# Patient Record
Sex: FEMALE | Race: BLACK OR AFRICAN AMERICAN | NOT HISPANIC OR LATINO | Employment: FULL TIME | URBAN - METROPOLITAN AREA
[De-identification: names, ages, dates, MRNs, and addresses within clinical notes are randomized per-mention and may not be internally consistent; named-entity substitution may affect disease eponyms.]

---

## 2018-02-26 ENCOUNTER — OFFICE VISIT (OUTPATIENT)
Dept: OBSTETRICS AND GYNECOLOGY | Facility: CLINIC | Age: 53
End: 2018-02-26
Payer: COMMERCIAL

## 2018-02-26 VITALS
WEIGHT: 187.38 LBS | SYSTOLIC BLOOD PRESSURE: 142 MMHG | BODY MASS INDEX: 34.48 KG/M2 | DIASTOLIC BLOOD PRESSURE: 92 MMHG | HEIGHT: 62 IN

## 2018-02-26 DIAGNOSIS — N92.1 MENORRHAGIA WITH IRREGULAR CYCLE: Primary | ICD-10-CM

## 2018-02-26 LAB
C TRACH DNA SPEC QL NAA+PROBE: NOT DETECTED
N GONORRHOEA DNA SPEC QL NAA+PROBE: NOT DETECTED

## 2018-02-26 PROCEDURE — 87491 CHLMYD TRACH DNA AMP PROBE: CPT

## 2018-02-26 PROCEDURE — 99999 PR PBB SHADOW E&M-EST. PATIENT-LVL III: CPT | Mod: PBBFAC,,, | Performed by: NURSE PRACTITIONER

## 2018-02-26 PROCEDURE — 81025 URINE PREGNANCY TEST: CPT | Mod: S$GLB,,, | Performed by: NURSE PRACTITIONER

## 2018-02-26 PROCEDURE — 3008F BODY MASS INDEX DOCD: CPT | Mod: S$GLB,,, | Performed by: NURSE PRACTITIONER

## 2018-02-26 PROCEDURE — 99214 OFFICE O/P EST MOD 30 MIN: CPT | Mod: S$GLB,,, | Performed by: NURSE PRACTITIONER

## 2018-02-26 RX ORDER — AMLODIPINE BESYLATE 5 MG/1
5 TABLET ORAL DAILY
Refills: 11 | COMMUNITY
Start: 2018-02-07 | End: 2022-07-28 | Stop reason: ALTCHOICE

## 2018-02-26 RX ORDER — MEDROXYPROGESTERONE ACETATE 10 MG/1
10 TABLET ORAL DAILY
Qty: 10 TABLET | Refills: 0 | Status: SHIPPED | OUTPATIENT
Start: 2018-02-26 | End: 2018-03-08

## 2018-02-26 NOTE — PATIENT INSTRUCTIONS
Heavy Menstrual Bleeding    Heavy menstrual bleeding means that your periods are heavier or longer than usual. You may soak through a pad or tampon every 1 to 3 hours on the heaviest days of your period. You may also pass large, dark clots. And your periods may last longer than 7 days.  If you have heavy periods often, this can cause a problem called anemia. With anemia, your red blood cell count is too low. Red blood cells are needed because they help carry oxygen throughout your body. Severe anemia may cause you to look pale and feel weak or tired. You might also become short of breath easily.  There are many possible causes of heavy menstrual bleeding. Hormonal imbalance is the most common cause. Having benign growths in your uterus, such as fibroids or polyps, is another cause. Taking certain medicines or having certain health problems or bleeding disorders are also causes.  To treat heavy menstrual bleeding, medicines are often tried first. If these dont help, further testing and treatments will likely be needed.  Home care  Medicines  If youre prescribed medicines, be sure to take them as directed.  · To help control heavy bleeding, any of the following may be used:  ¨ Hormone therapy (this includes all methods of hormonal birth control such as pills, shots, cream, ring, patch, or hormone-releasing IUD)  ¨ Nonsteroidal anti-inflammatory drugs (NSAIDs), such as ibuprofen  ¨ Antifibrinolytic medicines, such as transexamic acid  · To help treat anemia, iron supplements may be prescribed.            General care  · Get plenty of rest if you tire easily. Avoid heavy exertion.  · To help relieve pain or cramping, try using a heating pad on the lower belly or back. A warm bath may also help.  Follow-up care  Follow up with your healthcare provider as directed.  When to seek medical advice  Call your healthcare provider right away if any of these occur:  · Heavier bleeding (soaking 1 pad or tampon every hour for 3  hours)  · Heavy bleeding that lasts longer than 1 week  · Fever of 100.4ºF (38ºC) or higher, or as directed by your provider  · Pain or cramping that gets worse instead of better  · Signs of anemia such as pale skin, extreme fatigue or weakness, or shortness of breath  · Dizziness or fainting  Date Last Reviewed: 6/11/2015  © 0004-5707 [employer]. [address] [place] [address], [place] [address]. All rights reserved. This information is not intended as a substitute for professional medical care. Always follow your healthcare professional's instructions.        Dysfunctional Uterine Bleeding    Dysfunctional uterine bleeding is a condition in which bleeding is abnormal and occurs at unexpected times of the month. This happens because of changes in the hormones that help control a womans menstrual cycle each month.  The bleeding may be heavier or lighter than normal. If you have heavy bleeding often, this can lead to a problem called anemia. With anemia, your red blood cell count is too low. Red blood cells are needed because they help carry oxygen throughout your body. Severe anemia may cause you to look pale and feel very weak or tired. You might also become short of breath easily.  To treat dysfunctional uterine bleeding, medicines are often tried first. If these dont help, further testing and treatments may be needed. Discuss all of your options with your provider.  Home care  Medicines  If youre prescribed medicines, be sure to take them as directed. Some of the more common medicines you may be prescribed include:  · Hormone therapy (Options include most methods of hormonal birth control such as pills, shots, or a hormone-releasing IUD)  · Nonsteroidal anti-inflammatory drugs (NSAIDs), such as ibuprofen  · Iron supplements, if you have anemia     General care  · Get plenty of rest if you tire easily. Avoid heavy exertion.  · To help relieve pain or cramping that may occur with bleeding, try using a  heating pad on the lower belly or back. A warm bath may also help.  Follow-up care  Follow up with your healthcare provider as directed.  When to seek medical advice  Call your healthcare provider right away if:  · Bleeding becomes heavy (soaking 1 pad or tampon every hour for 3 hours)  · Increased abdominal pain  · Irregular bleeding worsens or does not get better even with treatment  · Fever of 100.4ºF (38ºC) or higher, or as directed by your provider  · Signs of anemia, such as pale skin, extreme fatigue or weakness, or shortness of breath  · Dizziness or fainting   Date Last Reviewed: 6/11/2015 © 2000-2017 [employer]. [address] [place] [address], [place] [address]. All rights reserved. This information is not intended as a substitute for professional medical care. Always follow your healthcare professional's instructions.        Understanding Uterine Bleeding  Your uterine bleeding may be heavy. Or you may have bleeding between periods. These problems may be caused by hormonal imbalance. Or they can be caused by uterine growths, an intrauterine device (IUD), bleeding disorder, or pregnancy.  Hormonal imbalance  Your menstrual cycle is controlled by hormones. The hormones include estrogen and progesterone. Sometimes there is too much or too little of 1 or both of these hormones. This can cause heavy periods. Or it can cause bleeding between periods. Causes of hormonal imbalance can include:  · Hormonal changes in teens and in women nearing menopause  · Diabetes, thyroid disease, or other medical problems  · Obesity  · Stress  · Strenuous exercise  · Anorexia (an eating disorder)  · Pregnancy  Uterine growths  There are different kinds of uterine growths. These include:  · Fibroids. These are round knots of muscle tissue in the uterus.  · Polyps. These are soft tissue growths in the uterine lining. They often hang into the uterus.  · Adenomyosis. This is when the uterine lining grows into the muscle  wall.  · Hyperplasia. This is when the uterine lining gets too thick or grows too much.  · Endometrial cancer. This is uncontrolled growth of part of the uterine lining.  Other causes of uterine bleeding  There are other causes of uterine bleeding. These include:  · IUD (intrauterine device). This is a method of birth control. Some IUDs contain hormones.  · Bleeding disorders. This is when the blood can't clot properly.  Treatment  Your health care provider can help diagnose the cause of your bleeding problem. He or she will work then work with you to plan treatment as needed.  Date Last Reviewed: 7/6/2015  © 3212-8879 [employer]. [address] [place] [address], [place] [address]. All rights reserved. This information is not intended as a substitute for professional medical care. Always follow your healthcare professional's instructions.

## 2018-02-26 NOTE — PROGRESS NOTES
"    Regla Ortez is a 52 y.o. female  presents for heavy painful bleeding for this month.  Still having monthly cycles typically no pain and flow is 3-5 days not too heavy.  But this month has lasted 10 days and has been heavy and more painful that usual.  LMP: Patient's last menstrual period was 2018..        Past Medical History:   Diagnosis Date    Hypertension      Past Surgical History:   Procedure Laterality Date     SECTION      x 1    SHOULDER SURGERY       Social History     Social History    Marital status:      Spouse name: N/A    Number of children: N/A    Years of education: N/A     Occupational History    Not on file.     Social History Main Topics    Smoking status: Never Smoker    Smokeless tobacco: Not on file    Alcohol use 0.0 oz/week      Comment: socially    Drug use: No    Sexual activity: Yes     Partners: Male     Birth control/ protection: None     Other Topics Concern    Not on file     Social History Narrative    No narrative on file     Family History   Problem Relation Age of Onset    Diabetes Maternal Grandmother     Miscarriages / Stillbirths Maternal Grandmother     Hypertension Mother     Diabetes Maternal Aunt     Hypertension Maternal Aunt     Stroke Maternal Uncle     Breast cancer Neg Hx     Cancer Neg Hx     Colon cancer Neg Hx     Eclampsia Neg Hx      labor Neg Hx     Ovarian cancer Neg Hx      OB History      Para Term  AB Living    3 2 2   1 2    SAB TAB Ectopic Multiple Live Births    1                  BP (!) 142/92   Ht 5' 2" (1.575 m)   Wt 85 kg (187 lb 6.3 oz)   LMP 2018   BMI 34.27 kg/m²       ROS:  Per hpi    PHYSICAL EXAM:  APPEARANCE: Well nourished, well developed, in no acute distress.  AFFECT: WNL, alert and oriented x 3    PELVIC: Normal external genitalia without lesions.  Normal hair distribution.  Adequate perineal body, normal urethral meatus.  Vagina moist and well " rugated without lesions or discharge - light bloody.  Cervix pink - some blood at os - tender with taking culture , without lesions, discharge or tenderness.  No significant cystocele or rectocele.  Bimanual exam shows uterus to be 8-10 week size, regular, mobile and tender.  Adnexa without masses or tenderness.    EXTREMITIES: No edema.  Physical Exam    1. Menorrhagia with irregular cycle  POCT Urine Pregnancy    US Pelvis Comp with Transvag NON-OB (xpd    medroxyPROGESTERone (PROVERA) 10 MG tablet    C. trachomatis/N. gonorrhoeae by AMP DNA Cervix    AND PLAN:    upt negative in office today  Check us and f/u with gyn MD

## 2018-02-27 ENCOUNTER — PATIENT MESSAGE (OUTPATIENT)
Dept: OBSTETRICS AND GYNECOLOGY | Facility: CLINIC | Age: 53
End: 2018-02-27

## 2018-03-01 ENCOUNTER — TELEPHONE (OUTPATIENT)
Dept: RADIOLOGY | Facility: HOSPITAL | Age: 53
End: 2018-03-01

## 2018-03-02 ENCOUNTER — PATIENT MESSAGE (OUTPATIENT)
Dept: OBSTETRICS AND GYNECOLOGY | Facility: CLINIC | Age: 53
End: 2018-03-02

## 2018-03-02 ENCOUNTER — HOSPITAL ENCOUNTER (OUTPATIENT)
Dept: RADIOLOGY | Facility: HOSPITAL | Age: 53
Discharge: HOME | End: 2018-03-02
Attending: NURSE PRACTITIONER
Payer: COMMERCIAL

## 2018-03-02 DIAGNOSIS — N92.1 MENORRHAGIA WITH IRREGULAR CYCLE: ICD-10-CM

## 2018-03-02 PROCEDURE — 76830 TRANSVAGINAL US NON-OB: CPT | Mod: TC,PO

## 2018-03-02 PROCEDURE — 76856 US EXAM PELVIC COMPLETE: CPT | Mod: 26,,, | Performed by: RADIOLOGY

## 2018-03-02 PROCEDURE — 76830 TRANSVAGINAL US NON-OB: CPT | Mod: 26,,, | Performed by: RADIOLOGY

## 2018-03-05 ENCOUNTER — OFFICE VISIT (OUTPATIENT)
Dept: OBSTETRICS AND GYNECOLOGY | Facility: CLINIC | Age: 53
End: 2018-03-05
Payer: COMMERCIAL

## 2018-03-05 VITALS
WEIGHT: 187.38 LBS | SYSTOLIC BLOOD PRESSURE: 130 MMHG | DIASTOLIC BLOOD PRESSURE: 80 MMHG | BODY MASS INDEX: 34.48 KG/M2 | HEIGHT: 62 IN

## 2018-03-05 DIAGNOSIS — D25.9 UTERINE LEIOMYOMA, UNSPECIFIED LOCATION: Primary | ICD-10-CM

## 2018-03-05 PROCEDURE — 99999 PR PBB SHADOW E&M-EST. PATIENT-LVL III: CPT | Mod: PBBFAC,,, | Performed by: OBSTETRICS & GYNECOLOGY

## 2018-03-05 PROCEDURE — 3079F DIAST BP 80-89 MM HG: CPT | Mod: S$GLB,,, | Performed by: OBSTETRICS & GYNECOLOGY

## 2018-03-05 PROCEDURE — 99213 OFFICE O/P EST LOW 20 MIN: CPT | Mod: S$GLB,,, | Performed by: OBSTETRICS & GYNECOLOGY

## 2018-03-05 PROCEDURE — 3075F SYST BP GE 130 - 139MM HG: CPT | Mod: S$GLB,,, | Performed by: OBSTETRICS & GYNECOLOGY

## 2018-03-05 NOTE — PROGRESS NOTES
Subjective:       Patient ID: Regla Ortez is a 52 y.o. female.    Chief Complaint:  Follow-up (ultrasound)      History of Present Illness  HPI  Uterine Fibroids  Patient presents with uterine fibroids. Periods are regular every 28-30 days, lasting 5 days. Last month, pt experienced a 10 day periods that was significantly heavier than usual an even passed several clots.  Dysmenorrhea:mild, occurring first 1-2 days of flow. Cyclic symptoms include none.  Cycles are moderate in flow.  Pt is sexually active with  (BTL).  Has no desire for future fertility.  Last pap NILM in .  Pt is a .  Labs in care everywhere reviewed (anemia).      GYN & OB History  Patient's last menstrual period was 2018.   Date of Last Pap: 2015    OB History    Para Term  AB Living   3 2 2   1 2   SAB TAB Ectopic Multiple Live Births   1              # Outcome Date GA Lbr Jean Paul/2nd Weight Sex Delivery Anes PTL Lv   3 SAB            2 Term      CS-Unspec      1 Term      Vag-Spont EPI            Review of Systems  Review of Systems   Constitutional: Positive for fatigue. Negative for activity change, appetite change, fever and unexpected weight change.   Respiratory: Negative for shortness of breath.    Cardiovascular: Negative for chest pain.   Gastrointestinal: Negative for abdominal pain, bloating, blood in stool, constipation, diarrhea, nausea and vomiting.   Genitourinary: Positive for menorrhagia and menstrual problem. Negative for dyspareunia, dysuria, flank pain, frequency, genital sores, hematuria, pelvic pain, vaginal bleeding, vaginal discharge, vaginal pain, dysmenorrhea, urinary incontinence and vaginal odor.   Musculoskeletal: Negative for back pain.   Neurological: Negative for syncope and headaches.           Objective:    Physical Exam:   Constitutional: She is oriented to person, place, and time. She appears well-developed and well-nourished. No distress.                            Neurological: She is alert and oriented to person, place, and time.     Psychiatric: She has a normal mood and affect. Her behavior is normal. Thought content normal.          US pelvis: Uterus is prominent in size measuring 14.0 x 5.9 x 7.6 cm.  Several well-circumscribed nodule areas of decreased echotexture identified within the anterior body/fundal and posterior mid body/lower uterine segment regions.  Appearance most consistent with fibroids.  These measure 2.8 and 2.9 cm maximum diameter in the fundal region, 2.6 cm and the LEFT anterior lateral body region and 1.3 cm cyst posteriorly in the lower uterine segment.  The endometrium measures 9.2 mm set.  No fluid within the endometrial or cervical canals.  Ovaries are normal and symmetric in appearance bilaterally with numerous follicles noted.  Flow identified within both ovaries on color/Doppler imaging.  No free fluid within the cul-de-sac or adnexal regions.  RIGHT ovary measures 2.7 x 2.0 x 2.7 cm and the LEFT 2.2 x 2.6 x 2.7 cm.    Assessment:        1. Uterine leiomyoma, unspecified location             Plan:      Uterine leiomyoma, unspecified location  -     Pt was counseled on fibroids, including common signs and symptoms.  Symptoms have only recently become disruptive and have led to anemia.  Pt is done with her fertility and has no desire for future childbearing.  Treatment options were reviewed with pt, including medical vs fibroid embolization vs myomectomy vs hysterectomy.  Pt voiced understanding and desires to think about options and monitor the progress of her symptoms.  Pt was counseled on warning signs.      Follow-up in about 3 months (around 6/5/2018).     Total time spent: 30 min. 50 % or more was spent on counseling about diagnosis, treatment options, and coordination of care.

## 2018-03-05 NOTE — PATIENT INSTRUCTIONS
What Are Fibroids?  Fibroids are growths made up of connective tissue and muscle cells that usually form in the wall of your uterus. Other names for fibroids are myomas and leiomyomas. Fibroids are the most common tumor in women. They are almost always noncancerous (benign) and harmless. Fibroids start as pea-sized lumps, but can grow steadily during your reproductive years. Many fibroids just need to be watched. Others may need treatment if they become too large or cause symptoms.    Potential problems  Fibroids often cause no symptoms. But a fibroid that grows quickly in your uterus can cause 1 or more of the following problems:  · Excessive uterine bleeding, leading to anemia (lack of red blood cells)  · Frequent urge to urinate  · Difficulty having bowel movements  · Achiness, heaviness, or fullness  · Back or abdominal pain  · Pain during intercourse  · Difficulty getting pregnant or being unable to get pregnant  · Problems with pregnancy  · Enlargement of the lower abdomen  Treatment is tailored for you  No 2 fibroids are the same. The type of treatment you will have depends on their number, size, location, and rate of growth. Your treatment decision also depends on the severity of your symptoms and whether or not you plan to have children in the future. There are a growing number of effective ways to treat fibroids. After your medical evaluation, your health care provider will be able to discuss with you the best options to solve your particular problem and meet your needs.  Your future checkups  Treating your fibroids is likely to relieve your symptoms. But your health care provider will want to check your progress. Ask your health care provider about any additional follow-up visits you might need.   Date Last Reviewed: 5/10/2015  © 9210-5746 [employer]. [address] [place] [address], [place] [address]. All rights reserved. This information is not intended as a substitute for professional medical  care. Always follow your healthcare professional's instructions.        Laparoscopic Hysterectomy: Your Experience  Talk to your healthcare provider about how to get ready for your surgery. Your healthcare providers will talk with you about what to expect as surgery draws near. Keep in mind that your experience may differ from that of other women you know.  Before the day of surgery  Your instructions may include the following:  · Stop taking certain medicines (including aspirin) for as many days before surgery as directed.  · If you smoke, stop as long as possible before surgery.  · Do not eat or drink anything after midnight the night before surgery. This includes chewing gum and mints.  · Arrange ahead of time for a ride home from the hospital or surgery center.  · If it is prescribed, take medicine to clean out your bowels the day before surgery. Your healthcare provider can give you more details about this.  On the day of surgery  Youll change into a gown. Youll then be prepped for your procedure:  · The anesthesiologist or nurse anesthetist will discuss anesthesia with you and answer any questions you have.  · Some pubic hair may be shaved.  · An IV (intravenous) line will be put into your arm or hand. This line supplies you with medicines and fluids before, during, and after surgery.  · You may be given medicine that helps you relax. You will then be given general anesthesia to make you sleep and keep you free from pain during surgery.  Risks and complications of laparoscopic hysterectomy  Once you understand these risks, you will be asked to sign a consent form. Risks and possible complications include:  · Side effects from anesthesia  · Infection  · Bleeding, with a possible need for a transfusion  · Blood clots  · Damage to the bladder, bowel, ureters, or nearby nerves or blood vessels  · Formation of scar tissue that may cause pain or bowel obstruction in the future (more common with abdominal  approach)  · Need for a second surgery   Date Last Reviewed: 3/1/2017  © 2933-4067 [employer] [address] [place] [address], [place] [address]. All rights reserved. This information is not intended as a substitute for professional medical care. Always follow your healthcare professional's instructions.

## 2018-04-06 ENCOUNTER — OFFICE VISIT (OUTPATIENT)
Dept: OBSTETRICS AND GYNECOLOGY | Facility: CLINIC | Age: 53
End: 2018-04-06
Payer: COMMERCIAL

## 2018-04-06 ENCOUNTER — LAB VISIT (OUTPATIENT)
Dept: LAB | Facility: HOSPITAL | Age: 53
End: 2018-04-06
Attending: NURSE PRACTITIONER
Payer: COMMERCIAL

## 2018-04-06 VITALS
BODY MASS INDEX: 35.07 KG/M2 | HEIGHT: 62 IN | DIASTOLIC BLOOD PRESSURE: 88 MMHG | SYSTOLIC BLOOD PRESSURE: 137 MMHG | WEIGHT: 190.56 LBS

## 2018-04-06 DIAGNOSIS — Z11.3 SCREENING FOR STD (SEXUALLY TRANSMITTED DISEASE): ICD-10-CM

## 2018-04-06 DIAGNOSIS — A59.01 TRICHOMONAS VAGINITIS: ICD-10-CM

## 2018-04-06 DIAGNOSIS — N89.8 VAGINAL ITCHING: Primary | ICD-10-CM

## 2018-04-06 PROCEDURE — 99999 PR PBB SHADOW E&M-EST. PATIENT-LVL III: CPT | Mod: PBBFAC,,, | Performed by: NURSE PRACTITIONER

## 2018-04-06 PROCEDURE — 86703 HIV-1/HIV-2 1 RESULT ANTBDY: CPT

## 2018-04-06 PROCEDURE — 3079F DIAST BP 80-89 MM HG: CPT | Mod: CPTII,S$GLB,, | Performed by: NURSE PRACTITIONER

## 2018-04-06 PROCEDURE — 87210 SMEAR WET MOUNT SALINE/INK: CPT | Mod: QW,S$GLB,, | Performed by: NURSE PRACTITIONER

## 2018-04-06 PROCEDURE — 36415 COLL VENOUS BLD VENIPUNCTURE: CPT | Mod: PO

## 2018-04-06 PROCEDURE — 99214 OFFICE O/P EST MOD 30 MIN: CPT | Mod: S$GLB,,, | Performed by: NURSE PRACTITIONER

## 2018-04-06 PROCEDURE — 80074 ACUTE HEPATITIS PANEL: CPT

## 2018-04-06 PROCEDURE — 87491 CHLMYD TRACH DNA AMP PROBE: CPT

## 2018-04-06 PROCEDURE — 86592 SYPHILIS TEST NON-TREP QUAL: CPT

## 2018-04-06 PROCEDURE — 3075F SYST BP GE 130 - 139MM HG: CPT | Mod: CPTII,S$GLB,, | Performed by: NURSE PRACTITIONER

## 2018-04-06 RX ORDER — METRONIDAZOLE 500 MG/1
TABLET ORAL
Qty: 8 TABLET | Refills: 0 | Status: SHIPPED | OUTPATIENT
Start: 2018-04-06 | End: 2022-07-28 | Stop reason: ALTCHOICE

## 2018-04-06 NOTE — PATIENT INSTRUCTIONS
Trichomonas Vaginal Infection (Trichomoniasis)    You have a Trichomonas vaginal infection. This problem is often called trich. It is caused by a parasite that is passed during sex. This makes trich a sexually transmitted disease (STD). Both men and women can get trich, but it is more common in women.  Most people who have trich dont have any symptoms at first. If symptoms do occur, they may take weeks or months to develop.  Symptoms in women can include:  · Thin discharge from the vagina that may smell bad and be clear, white, gray, green, or yellow in color  · Itching, burning, redness, or soreness in or around the vagina  · Pain in the lower belly  · Frequent urination or pain and burning during urination  · Pain during sex  Symptoms in men are not very common. Men may have trich and pass it to women during sex without knowing they were ever infected.  Trich is most often treated with antibiotics. Without treatment, trich can increase the risk of more serious health problems such as:  · Pelvic inflammatory disease (PID)  ·  delivery (giving birth to a baby early if youre pregnant)  · HIV and certain other sexually transmitted diseases (STDs)  Home care  · Take the antibiotics youre prescribed exactly as directed. Finish all of the medicine, even if your symptoms go away.  · Avoid drinking alcohol until youre done with your treatment.  · Tell any partners you have sex with that you have trich. They will need be tested for trich and possibly treated as well.  · Avoid having sex until you and any partners you have sex with are confirmed to no longer have trich.  Prevention  The only way to avoid getting trich or any other STD is to avoid having sex. If you choose to have sex, then take steps to lower your health risks:  · Use condoms when having sex.  · Limit the number of partners you have sex with.  · Get tested regularly for STDs. Ask any partner you have sex with to do the same.  · Dont have sex  with anyone who has symptoms that may be due to an STD.  Follow-up care  Follow up with your healthcare provider, or as advised. Testing will likely be done to ensure that the infection has cleared.  When to seek medical advice  Call your healthcare provider right away if:  · You have a fever of 100.4ºF (38ºC) or higher, or as directed by your provider.  · Your symptoms worsen, or they dont go away even after completing your treatment.  · You have new pain in the lower belly or pelvic region.  · You have side effects that bother you or a reaction to the medicine youre taking.  · You or any partners you have sex with have new symptoms, such as a rash, joint pain, or sores.  Date Last Reviewed: 7/30/2015  © 4761-7723 [employer]. [address] [place] [address], [place] [address]. All rights reserved. This information is not intended as a substitute for professional medical care. Always follow your healthcare professional's instructions.

## 2018-04-06 NOTE — PROGRESS NOTES
"Regla Ortez is a 52 y.o. female  presents with complaint of vaginal discharge for 1 week.  She reports itching.  reports odor.  She states the discharge is yellow.      Past Medical History:   Diagnosis Date    Anemia     Hypertension      Past Surgical History:   Procedure Laterality Date     SECTION      x 1    SHOULDER SURGERY       Social History   Substance Use Topics    Smoking status: Never Smoker    Smokeless tobacco: Never Used    Alcohol use 0.0 oz/week      Comment: socially     Family History   Problem Relation Age of Onset    Diabetes Maternal Grandmother     Miscarriages / Stillbirths Maternal Grandmother     Hypertension Mother     Diabetes Maternal Aunt     Hypertension Maternal Aunt     Stroke Maternal Uncle     Breast cancer Neg Hx     Cancer Neg Hx     Colon cancer Neg Hx     Eclampsia Neg Hx      labor Neg Hx     Ovarian cancer Neg Hx      OB History    Para Term  AB Living   3 2 2   1 2   SAB TAB Ectopic Multiple Live Births   1              # Outcome Date GA Lbr Jean Paul/2nd Weight Sex Delivery Anes PTL Lv   3 SAB            2 Term      CS-Unspec      1 Term      Vag-Spont EPI            /88   Ht 5' 2" (1.575 m)   Wt 86.4 kg (190 lb 9.4 oz)   LMP 2018 (Exact Date)   BMI 34.86 kg/m²     ROS:  GENERAL: No fever, chills, fatigability or weight loss.  VULVAR: No pain, no lesions and no itching.  VAGINAL: No relaxation, no itching, no discharge, no abnormal bleeding and no lesions.  ABDOMEN: No abdominal pain. Denies nausea. Denies vomiting. No diarrhea. No constipation  BREAST: Denies pain. No lumps. No discharge.  URINARY: No incontinence, no nocturia, no frequency and no dysuria.  CARDIOVASCULAR: No chest pain. No shortness of breath. No leg cramps.  NEUROLOGICAL: No headaches. No vision changes.    PHYSICAL EXAM:  VULVA: normal appearing vulva with no masses, tenderness or lesions, VAGINA: vaginal discharge - frothy and " yellow, CERVIX: normal appearing cervix without discharge or lesions, DNA probe for chlamydia and GC obtained, UTERUS: uterus is 12-14 week size, shape, consistency and nontender, ADNEXA: normal adnexa in size, nontender and no masses, WET MOUNT done - results: trichomonads    ASSESSMENT and PLAN:  1. Vaginal itching  POCT Wet Prep    C. trachomatis/N. gonorrhoeae by AMP DNA Cervix    metroNIDAZOLE (FLAGYL) 500 MG tablet   2. Screening for STD (sexually transmitted disease)  C. trachomatis/N. gonorrhoeae by AMP DNA Cervix    HIV-1 and HIV-2 antibodies    RPR    Hepatitis panel, acute   3. Trichomonas vaginitis  POCT Wet Prep    metroNIDAZOLE (FLAGYL) 500 MG tablet       Patient was counseled today on vaginitis prevention including :  a. avoiding feminine products such as deoderant soaps, body wash, bubble bath, douches, scented toilet paper, deoderant tampons or pads, feminine wipes, chronic pad use, etc.  b. avoiding other vulvovaginal irritants such as long hot baths, humidity, tight, synthetic clothing, chlorine and sitting around in wet bathing suits  c. wearing cotton underwear, avoiding thong underwear and no underwear to bed  d. taking showers instead of baths and use a hair dryer on cool setting afterwards to dry  e. wearing cotton to exercise and shower immediately after exercise and change clothes  f. using polyurethane condoms without spermicide if sexually active and symptoms are triggered by intercourse

## 2018-04-07 LAB
C TRACH DNA SPEC QL NAA+PROBE: NOT DETECTED
N GONORRHOEA DNA SPEC QL NAA+PROBE: NOT DETECTED
RPR SER QL: NORMAL

## 2018-04-09 ENCOUNTER — PATIENT MESSAGE (OUTPATIENT)
Dept: OBSTETRICS AND GYNECOLOGY | Facility: CLINIC | Age: 53
End: 2018-04-09

## 2018-04-09 LAB
HAV IGM SERPL QL IA: NEGATIVE
HBV CORE IGM SERPL QL IA: NEGATIVE
HBV SURFACE AG SERPL QL IA: NEGATIVE
HCV AB SERPL QL IA: NEGATIVE
HIV 1+2 AB+HIV1 P24 AG SERPL QL IA: NEGATIVE

## 2018-04-11 ENCOUNTER — TELEPHONE (OUTPATIENT)
Dept: OBSTETRICS AND GYNECOLOGY | Facility: CLINIC | Age: 53
End: 2018-04-11

## 2018-04-11 NOTE — TELEPHONE ENCOUNTER
Pt stated that she was told she had Trichomonas through sexual contact, and she could not understand how she was wilmer this, because her  went to the doctor and he was told that he did not have anything. She also inquired about the fibroids, and is aware of her doctor's appt she has with Dr. Hebert on 6/1/18. Please Advise.

## 2018-04-11 NOTE — TELEPHONE ENCOUNTER
----- Message from Jaylyn Moses sent at 4/11/2018  3:29 PM CDT -----  Contact: self [phone]  Would like to consult with nurse regarding issue with fibroids.  Please call back at [phone].   Md Katherine

## 2018-04-12 ENCOUNTER — TELEPHONE (OUTPATIENT)
Dept: OBSTETRICS AND GYNECOLOGY | Facility: CLINIC | Age: 53
End: 2018-04-12

## 2018-04-12 NOTE — TELEPHONE ENCOUNTER
Spoke with pt and pt was concerned about trich affecting her fibroid. Emphasized to pt that trich is an STD as has no relation to her getting a fibroid. Pt verbalized understanding.

## 2018-04-12 NOTE — TELEPHONE ENCOUNTER
----- Message from Ericka Barrett sent at 4/12/2018 11:10 AM CDT -----  Contact: pt  Pt stated she was returning a call and can be reached at [phone] please call after 12:15 Thanks

## 2018-04-12 NOTE — TELEPHONE ENCOUNTER
----- Message from Eli Roberts sent at 4/12/2018 10:46 AM CDT -----  Contact: pt  Questions about her last test results. Cell [phone]

## 2018-04-12 NOTE — TELEPHONE ENCOUNTER
Pt notified that vag cx were negative for gt/ct and also hiv/rps and hepatitis labs were negative with verbal understanding ...noelle

## 2018-04-12 NOTE — TELEPHONE ENCOUNTER
Trichomonas is typically only contracted through sexual contact and he can only be tested through urine and typically the male does test negative b/c difficult to  in their urine - it has to come from person she is sexually active with  Keep her appt as scheduled for her fibroids

## 2018-04-23 ENCOUNTER — TELEPHONE (OUTPATIENT)
Dept: OBSTETRICS AND GYNECOLOGY | Facility: CLINIC | Age: 53
End: 2018-04-23

## 2018-04-23 NOTE — TELEPHONE ENCOUNTER
Attempted to call pt. Left voicemail for pt to give clinic a call back. Need to reschedule 04/23/18 appkayley Bernice is not in clinic.

## 2018-04-23 NOTE — TELEPHONE ENCOUNTER
----- Message from Santa Edgar sent at 4/23/2018  2:38 PM CDT -----  returned call, deyanira work in before 10a....[phone]

## 2018-04-26 ENCOUNTER — OFFICE VISIT (OUTPATIENT)
Dept: OBSTETRICS AND GYNECOLOGY | Facility: CLINIC | Age: 53
End: 2018-04-26
Payer: COMMERCIAL

## 2018-04-26 VITALS
HEIGHT: 62 IN | DIASTOLIC BLOOD PRESSURE: 98 MMHG | WEIGHT: 193.31 LBS | SYSTOLIC BLOOD PRESSURE: 134 MMHG | BODY MASS INDEX: 35.57 KG/M2

## 2018-04-26 DIAGNOSIS — A59.01 TRICHOMONAS VAGINITIS: Primary | ICD-10-CM

## 2018-04-26 PROCEDURE — 3075F SYST BP GE 130 - 139MM HG: CPT | Mod: CPTII,S$GLB,, | Performed by: NURSE PRACTITIONER

## 2018-04-26 PROCEDURE — 87210 SMEAR WET MOUNT SALINE/INK: CPT | Mod: QW,S$GLB,, | Performed by: NURSE PRACTITIONER

## 2018-04-26 PROCEDURE — 3080F DIAST BP >= 90 MM HG: CPT | Mod: CPTII,S$GLB,, | Performed by: NURSE PRACTITIONER

## 2018-04-26 PROCEDURE — 99999 PR PBB SHADOW E&M-EST. PATIENT-LVL III: CPT | Mod: PBBFAC,,, | Performed by: NURSE PRACTITIONER

## 2018-04-26 PROCEDURE — 99214 OFFICE O/P EST MOD 30 MIN: CPT | Mod: S$GLB,,, | Performed by: NURSE PRACTITIONER

## 2018-04-26 NOTE — PROGRESS NOTES
female who presents for [facility] for trichamonas.  STI Exposure: Yes -- trichomonas.    Review of Systems  Negative per HPI     Objective:      /90  Ht 5' (1.524 m)  Wt 248 lb (112.492 kg)  BMI 48.43 kg/m2  LMP 07/03/2013  General:   No distress   Pelvic:  NEFG, uterus 12-14 week with fibroid, discharge normal, cervix normal       Assessment:   Wet prep negative   Encounter Diagnoses   Name Primary?    Trichomonas Yes               Plan:      Discussed safe sexual practice in detail  Avoid irritants, douching, feminine hygiene products .   GC/CT cultures and triple smear done and negative at last visit .  STD profile was ordered and was negative

## 2018-05-29 ENCOUNTER — TELEPHONE (OUTPATIENT)
Dept: OBSTETRICS AND GYNECOLOGY | Facility: CLINIC | Age: 53
End: 2018-05-29

## 2018-05-29 NOTE — TELEPHONE ENCOUNTER
Patient was calling to see if she can reschedule her appointment to next week.  Patient is rescheduled for 06/05 at 8am.

## 2018-05-29 NOTE — TELEPHONE ENCOUNTER
----- Message from Shiloh Clemente sent at 5/29/2018  3:30 PM CDT -----  Contact: Pt  Please give pt a call at [phone] regarding some questions about her appt.

## 2018-06-05 ENCOUNTER — OFFICE VISIT (OUTPATIENT)
Dept: OBSTETRICS AND GYNECOLOGY | Facility: CLINIC | Age: 53
End: 2018-06-05
Payer: COMMERCIAL

## 2018-06-05 VITALS
SYSTOLIC BLOOD PRESSURE: 142 MMHG | WEIGHT: 198.44 LBS | DIASTOLIC BLOOD PRESSURE: 88 MMHG | BODY MASS INDEX: 36.29 KG/M2

## 2018-06-05 DIAGNOSIS — D25.9 UTERINE LEIOMYOMA, UNSPECIFIED LOCATION: Primary | ICD-10-CM

## 2018-06-05 PROCEDURE — 99999 PR PBB SHADOW E&M-EST. PATIENT-LVL III: CPT | Mod: PBBFAC,,, | Performed by: OBSTETRICS & GYNECOLOGY

## 2018-06-05 PROCEDURE — 3079F DIAST BP 80-89 MM HG: CPT | Mod: CPTII,S$GLB,, | Performed by: OBSTETRICS & GYNECOLOGY

## 2018-06-05 PROCEDURE — 3077F SYST BP >= 140 MM HG: CPT | Mod: CPTII,S$GLB,, | Performed by: OBSTETRICS & GYNECOLOGY

## 2018-06-05 PROCEDURE — 3008F BODY MASS INDEX DOCD: CPT | Mod: CPTII,S$GLB,, | Performed by: OBSTETRICS & GYNECOLOGY

## 2018-06-05 PROCEDURE — 99212 OFFICE O/P EST SF 10 MIN: CPT | Mod: S$GLB,,, | Performed by: OBSTETRICS & GYNECOLOGY

## 2018-06-05 NOTE — PATIENT INSTRUCTIONS
Laparoscopic Hysterectomy: Your Experience  Talk to your healthcare provider about how to get ready for your surgery. Your healthcare providers will talk with you about what to expect as surgery draws near. Keep in mind that your experience may differ from that of other women you know.  Before the day of surgery  Your instructions may include the following:  · Stop taking certain medicines (including aspirin) for as many days before surgery as directed.  · If you smoke, stop as long as possible before surgery.  · Do not eat or drink anything after midnight the night before surgery. This includes chewing gum and mints.  · Arrange ahead of time for a ride home from the hospital or surgery center.  · If it is prescribed, take medicine to clean out your bowels the day before surgery. Your healthcare provider can give you more details about this.  On the day of surgery  Youll change into a gown. Youll then be prepped for your procedure:  · The anesthesiologist or nurse anesthetist will discuss anesthesia with you and answer any questions you have.  · Some pubic hair may be shaved.  · An IV (intravenous) line will be put into your arm or hand. This line supplies you with medicines and fluids before, during, and after surgery.  · You may be given medicine that helps you relax. You will then be given general anesthesia to make you sleep and keep you free from pain during surgery.  Risks and complications of laparoscopic hysterectomy  Once you understand these risks, you will be asked to sign a consent form. Risks and possible complications include:  · Side effects from anesthesia  · Infection  · Bleeding, with a possible need for a transfusion  · Blood clots  · Damage to the bladder, bowel, ureters, or nearby nerves or blood vessels  · Formation of scar tissue that may cause pain or bowel obstruction in the future (more common with abdominal approach)  · Need for a second surgery   Date Last Reviewed: 3/1/2017  ©  8354-8235 [employer]. [address] [place] [address]. All rights reserved. This information is not intended as a substitute for professional medical care. Always follow your healthcare professional's instructions.        Laparoscopic Hysterectomy: Your Home Recovery  When you leave the hospital, youll receive instructions on caring for yourself at home. Following these instructions helps ensure a faster recovery. It often takes about 1 week to 4 weeks to recover from laparoscopic hysterectomy. But recovery time varies from woman to woman.    Taking care of yourself  Follow these tips to make your recovery as safe and comfortable as possible:  · To avoid constipation, eat fruits, vegetables, and whole grains. Drink plenty of water. Your healthcare provider may suggest that you use a laxative or a mild stool softener.  · Ask your friends and family to help with chores and errands while you recover.  · Do not lift anything over 10 pounds to avoid straining your incisions.  · Do not get your incisions wet until your healthcare provider says its OK to do so.  · Do not put anything in the vagina until your provider says its safe to do so. This includes using tampons and douches and having sexual intercourse.  · Schedule follow-up visits with your healthcare provider.  When to call your healthcare provider  Call your healthcare provider if you notice any of these:  · Chills or a fever of 100.4°F (38°C) or higher, or as advised  · Bright red vaginal bleeding or a smelly discharge  · Trouble urinating or burning during urination  · Severe abdominal pain or bloating  · A red, swollen, or draining smelly fluid from the incision sites  · Trouble breathing or fainting  · Swollen painful leg   Date Last Reviewed: 3/1/2017  © 0888-0491 [employer]. [address] [place] [address]. All rights reserved. This information is not intended as a substitute for professional medical  care. Always follow your healthcare professional's instructions.        Laparoscopic Hysterectomy: Your Hospital Recovery     Walking soon after surgery helps prevent blood clots and breathing problems.     After your procedure, you may go home later the same day of your surgery. Or, you may stay in the hospital or surgery center for 1 to 2 days. Your healthcare providers will help with your recovery during this time. Before you go home, get answers to any questions you have.  Recovery right after surgery  After the procedure is finished, youll spend a few hours in the recovery area. You may feel drowsy or nauseated from the anesthesia. You may also have shoulder pain due to the gas used to expand your abdomen. Your throat may be sore and you may be hoarse due to the breathing tube used during surgery.  What to expect during your hospital stay  During your stay in the hospital or surgery center, the staff will look after you and help you prepare to go home. During this time:  · You will be asked to walk around to help improve breathing and blood flow.  · You will be given medicine for any pain you have. Tell your nurse if you are uncomfortable.  · Incisions may leak a small amount of pinkish fluid for the first couple of days.  · Spotting from the vagina may occur. If so, ask the nurse for a sanitary pad.  · You will be shown how to clear your lungs to prevent infections.  · The catheter may be left in place for a short time after surgery. Youll be checked to make sure you can urinate without the catheter before you go home.  · Your meals can include solid food as soon as you feel ready.  Date Last Reviewed: 3/1/2017  © 1428-6074 [employer]. [address] [place] [address], [place] [address]. All rights reserved. This information is not intended as a substitute for professional medical care. Always follow your healthcare professional's instructions.

## 2018-06-05 NOTE — PROGRESS NOTES
Subjective:       Patient ID: Regla Ortez is a 52 y.o. female.    Chief Complaint:  Follow-up      History of Present Illness  HPI  Pt is here for follow up.  Pt reports that, overall, symptoms have remained unchanged and have been well tolerated.  However, pt is leaning more towards surgery and would like to discuss options again.    GYN & OB History  Patient's last menstrual period was 2018 (approximate).   Date of Last Pap: 2015    OB History    Para Term  AB Living   3 2 2   1 2   SAB TAB Ectopic Multiple Live Births   1              # Outcome Date GA Lbr Jean Paul/2nd Weight Sex Delivery Anes PTL Lv   3 SAB            2 Term      CS-Unspec      1 Term      Vag-Spont EPI            Review of Systems  Review of Systems   Constitutional: Negative for activity change, appetite change, fatigue, fever and unexpected weight change.   Respiratory: Negative for shortness of breath.    Cardiovascular: Negative for chest pain.   Gastrointestinal: Negative for abdominal pain, bloating, blood in stool, constipation, diarrhea, nausea and vomiting.   Genitourinary: Positive for pelvic pain. Negative for dyspareunia, menorrhagia, menstrual problem, vaginal bleeding, vaginal discharge, vaginal pain, dysmenorrhea, urinary incontinence and vaginal odor.   Musculoskeletal: Positive for back pain.   Neurological: Negative for syncope and headaches.           Objective:    Physical Exam:   Constitutional: She is oriented to person, place, and time. She appears well-developed and well-nourished. No distress.                           Neurological: She is alert and oriented to person, place, and time.     Psychiatric: She has a normal mood and affect. Her behavior is normal. Thought content normal.          Assessment:        1. Uterine leiomyoma, unspecified location             Plan:      Uterine leiomyoma, unspecified location  -     Pt was again counseled on fibroids, including common signs and  symptoms.  Symptoms remain somewhat disruptive.  Pt is done with her fertility and has no desire for future childbearing.  Treatment options were again reviewed with pt, including medical vs fibroid embolization vs myomectomy vs hysterectomy.  Pt voiced understanding and is most interested in hysterectomy.  Surgery details, indications, risks, and benefits were reviewed with pt.  Pt desires to think about options some more and will continue with expectant management for now.  Pt is a candidate for hysterectomy if she desires.      Follow-up in about 3 months (around 9/5/2018).

## 2022-07-26 ENCOUNTER — CLINICAL SUPPORT (OUTPATIENT)
Dept: OTHER | Facility: CLINIC | Age: 57
End: 2022-07-26
Payer: COMMERCIAL

## 2022-07-26 DIAGNOSIS — Z00.8 ENCOUNTER FOR OTHER GENERAL EXAMINATION: ICD-10-CM

## 2022-07-26 PROCEDURE — 80061 LIPID PANEL: CPT | Mod: QW,S$GLB,, | Performed by: INTERNAL MEDICINE

## 2022-07-26 PROCEDURE — 80061 PR  LIPID PANEL: ICD-10-PCS | Mod: QW,S$GLB,, | Performed by: INTERNAL MEDICINE

## 2022-07-26 PROCEDURE — 82947 PR  ASSAY QUANTITATIVE,BLOOD GLUCOSE: ICD-10-PCS | Mod: QW,S$GLB,, | Performed by: INTERNAL MEDICINE

## 2022-07-26 PROCEDURE — 99401 PREV MED CNSL INDIV APPRX 15: CPT | Mod: S$GLB,,, | Performed by: INTERNAL MEDICINE

## 2022-07-26 PROCEDURE — 99401 PR PREVENT COUNSEL,INDIV,15 MIN: ICD-10-PCS | Mod: S$GLB,,, | Performed by: INTERNAL MEDICINE

## 2022-07-26 PROCEDURE — 82947 ASSAY GLUCOSE BLOOD QUANT: CPT | Mod: QW,S$GLB,, | Performed by: INTERNAL MEDICINE

## 2022-07-27 ENCOUNTER — NURSE TRIAGE (OUTPATIENT)
Dept: ADMINISTRATIVE | Facility: CLINIC | Age: 57
End: 2022-07-27
Payer: COMMERCIAL

## 2022-07-27 VITALS
DIASTOLIC BLOOD PRESSURE: 91 MMHG | SYSTOLIC BLOOD PRESSURE: 185 MMHG | HEIGHT: 63 IN | BODY MASS INDEX: 35.08 KG/M2 | WEIGHT: 198 LBS

## 2022-07-27 LAB
GLUCOSE SERPL-MCNC: 96 MG/DL (ref 60–140)
HDLC SERPL-MCNC: 58 MG/DL
POC CHOLESTEROL, LDL (DOCK): 96.66 MG/DL
POC CHOLESTEROL, TOTAL: 172 MG/DL
TRIGL SERPL-MCNC: 95 MG/DL

## 2022-07-27 NOTE — TELEPHONE ENCOUNTER
Patient states she had a hypertensive episode at a [facility] on yesterday, 7/26/22. Patient states her blood pressure reading at the Health [facility] was 186/64. Chart Review documents patient's blood pressure reading on yesterday as 185/91. Patient states she was advised to follow-up with her PCP ASAP.     Patient states history of Hypertension and treatment with antihypertensives. Patient states that she has not been under the care of a Physician to treat her diagnosis of Hypertension for approximately 4-5 years and last dose of antihypertensives with also approximately 4-5 years ago. Patient states she has made an appointment with her PCP that is scheduled for tomorrow, 7/28/22.    Care Advice given to to See PCP Within 24 Hours. Patient states understanding of Care Advice and cites no additional concerns at this time. Triage RN also advised patient to limit sodium intake, avoid caffeinated drinks and excess sugar and to begin walking for exercise once cleared by PCP.    Reason for Disposition   Systolic BP  >= 180 OR Diastolic >= 110    Additional Information   Negative: Difficult to awaken or acting confused (e.g., disoriented, slurred speech)   Negative: SEVERE difficulty breathing (e.g., struggling for each breath, speaks in single words)   Negative: [1] Weakness of the face, arm or leg on one side of the body AND [2] new-onset   Negative: [1] Numbness (i.e., loss of sensation) of the face, arm or leg on one side of the body AND [2] new-onset   Negative: [1] Chest pain lasts > 5 minutes AND [2] history of heart disease  (i.e., heart attack, bypass surgery, angina, angioplasty, CHF)   Negative: [1] Chest pain AND [2] took nitrogylcerin AND [3] pain was not relieved   Negative: Sounds like a life-threatening emergency to the triager   Negative: Symptom is main concern  (e.g., headache, chest pain)   Negative: Low blood pressure is main concern   Negative: [1] Systolic BP  >= 160 OR Diastolic >= 100  AND [2] cardiac or neurologic symptoms (e.g., chest pain, difficulty breathing, unsteady gait, blurred vision)   Negative: [1] Pregnant 20 or more weeks (or postpartum < 6 weeks) AND [2] new hand or face swelling   Negative: [1] Pregnant 20 or more weeks AND [2] Systolic BP  >= 140 OR Diastolic >= 90   Negative: [1] Systolic BP  >= 200 OR Diastolic >= 120  AND [2] having NO cardiac or neurologic symptoms   Negative: [1] Postpartum < 6 weeks AND [2] Systolic BP  >= 140 OR Diastolic >= 90   Negative: [1] Systolic BP  >= 180 OR Diastolic >= 110 AND [2] missed most recent dose of blood pressure medication    Protocols used: BLOOD PRESSURE - HIGH-A-

## 2022-07-28 ENCOUNTER — TELEPHONE (OUTPATIENT)
Dept: INTERNAL MEDICINE | Facility: CLINIC | Age: 57
End: 2022-07-28

## 2022-07-28 ENCOUNTER — HOSPITAL ENCOUNTER (OUTPATIENT)
Dept: CARDIOLOGY | Facility: HOSPITAL | Age: 57
Discharge: HOME | End: 2022-07-28
Attending: PHYSICIAN ASSISTANT
Payer: COMMERCIAL

## 2022-07-28 ENCOUNTER — OFFICE VISIT (OUTPATIENT)
Dept: INTERNAL MEDICINE | Facility: CLINIC | Age: 57
End: 2022-07-28
Payer: COMMERCIAL

## 2022-07-28 VITALS
WEIGHT: 200.19 LBS | HEART RATE: 61 BPM | HEIGHT: 63 IN | DIASTOLIC BLOOD PRESSURE: 79 MMHG | OXYGEN SATURATION: 98 % | BODY MASS INDEX: 35.47 KG/M2 | SYSTOLIC BLOOD PRESSURE: 140 MMHG

## 2022-07-28 DIAGNOSIS — Z12.31 ENCOUNTER FOR SCREENING MAMMOGRAM FOR MALIGNANT NEOPLASM OF BREAST: ICD-10-CM

## 2022-07-28 DIAGNOSIS — Z12.11 SCREEN FOR COLON CANCER: ICD-10-CM

## 2022-07-28 DIAGNOSIS — D50.9 IRON DEFICIENCY ANEMIA, UNSPECIFIED IRON DEFICIENCY ANEMIA TYPE: ICD-10-CM

## 2022-07-28 DIAGNOSIS — I10 PRIMARY HYPERTENSION: Primary | ICD-10-CM

## 2022-07-28 DIAGNOSIS — I10 PRIMARY HYPERTENSION: ICD-10-CM

## 2022-07-28 DIAGNOSIS — L65.9 HAIR LOSS: ICD-10-CM

## 2022-07-28 PROCEDURE — 99204 PR OFFICE/OUTPT VISIT, NEW, LEVL IV, 45-59 MIN: ICD-10-PCS | Mod: S$GLB,,, | Performed by: PHYSICIAN ASSISTANT

## 2022-07-28 PROCEDURE — 3008F BODY MASS INDEX DOCD: CPT | Mod: CPTII,S$GLB,, | Performed by: PHYSICIAN ASSISTANT

## 2022-07-28 PROCEDURE — 4010F ACE/ARB THERAPY RXD/TAKEN: CPT | Mod: CPTII,S$GLB,, | Performed by: PHYSICIAN ASSISTANT

## 2022-07-28 PROCEDURE — 93005 ELECTROCARDIOGRAM TRACING: CPT

## 2022-07-28 PROCEDURE — 99999 PR PBB SHADOW E&M-EST. PATIENT-LVL V: ICD-10-PCS | Mod: PBBFAC,,, | Performed by: PHYSICIAN ASSISTANT

## 2022-07-28 PROCEDURE — 1160F RVW MEDS BY RX/DR IN RCRD: CPT | Mod: CPTII,S$GLB,, | Performed by: PHYSICIAN ASSISTANT

## 2022-07-28 PROCEDURE — 1159F PR MEDICATION LIST DOCUMENTED IN MEDICAL RECORD: ICD-10-PCS | Mod: CPTII,S$GLB,, | Performed by: PHYSICIAN ASSISTANT

## 2022-07-28 PROCEDURE — 93010 EKG 12-LEAD: ICD-10-PCS | Mod: ,,, | Performed by: INTERNAL MEDICINE

## 2022-07-28 PROCEDURE — 3077F SYST BP >= 140 MM HG: CPT | Mod: CPTII,S$GLB,, | Performed by: PHYSICIAN ASSISTANT

## 2022-07-28 PROCEDURE — 93010 ELECTROCARDIOGRAM REPORT: CPT | Mod: ,,, | Performed by: INTERNAL MEDICINE

## 2022-07-28 PROCEDURE — 3008F PR BODY MASS INDEX (BMI) DOCUMENTED: ICD-10-PCS | Mod: CPTII,S$GLB,, | Performed by: PHYSICIAN ASSISTANT

## 2022-07-28 PROCEDURE — 3078F DIAST BP <80 MM HG: CPT | Mod: CPTII,S$GLB,, | Performed by: PHYSICIAN ASSISTANT

## 2022-07-28 PROCEDURE — 4010F PR ACE/ARB THEARPY RXD/TAKEN: ICD-10-PCS | Mod: CPTII,S$GLB,, | Performed by: PHYSICIAN ASSISTANT

## 2022-07-28 PROCEDURE — 99999 PR PBB SHADOW E&M-EST. PATIENT-LVL V: CPT | Mod: PBBFAC,,, | Performed by: PHYSICIAN ASSISTANT

## 2022-07-28 PROCEDURE — 3078F PR MOST RECENT DIASTOLIC BLOOD PRESSURE < 80 MM HG: ICD-10-PCS | Mod: CPTII,S$GLB,, | Performed by: PHYSICIAN ASSISTANT

## 2022-07-28 PROCEDURE — 99204 OFFICE O/P NEW MOD 45 MIN: CPT | Mod: S$GLB,,, | Performed by: PHYSICIAN ASSISTANT

## 2022-07-28 PROCEDURE — 1159F MED LIST DOCD IN RCRD: CPT | Mod: CPTII,S$GLB,, | Performed by: PHYSICIAN ASSISTANT

## 2022-07-28 PROCEDURE — 1160F PR REVIEW ALL MEDS BY PRESCRIBER/CLIN PHARMACIST DOCUMENTED: ICD-10-PCS | Mod: CPTII,S$GLB,, | Performed by: PHYSICIAN ASSISTANT

## 2022-07-28 PROCEDURE — 3077F PR MOST RECENT SYSTOLIC BLOOD PRESSURE >= 140 MM HG: ICD-10-PCS | Mod: CPTII,S$GLB,, | Performed by: PHYSICIAN ASSISTANT

## 2022-07-28 RX ORDER — NEBULIZER AND COMPRESSOR
EACH MISCELLANEOUS
Qty: 1 EACH | Refills: 0 | Status: SHIPPED | OUTPATIENT
Start: 2022-07-28

## 2022-07-28 RX ORDER — AMLODIPINE AND BENAZEPRIL HYDROCHLORIDE 5; 10 MG/1; MG/1
1 CAPSULE ORAL DAILY
Qty: 30 CAPSULE | Refills: 1 | Status: SHIPPED | OUTPATIENT
Start: 2022-07-28 | End: 2023-07-28

## 2022-07-28 NOTE — PROGRESS NOTES
Subjective:      Patient ID: Regla Ortez is a 56 y.o. female.    Chief Complaint: Hypertension    Hypertension  This is a new problem. The problem is uncontrolled. Pertinent negatives include no anxiety, blurred vision, chest pain, headaches, malaise/fatigue, neck pain, orthopnea, palpitations, peripheral edema, PND, shortness of breath or sweats. There are no associated agents to hypertension. Risk factors for coronary artery disease include obesity and stress. Past treatments include ACE inhibitors, calcium channel blockers and diuretics.     /64 at [facility]. Did have htn in the past. Was on amlodipine, chlorthalidone, and lisinopril in the past. Stopped around covid. Lost to follow up.    Review of Systems   Constitutional: Negative for activity change, appetite change, chills, diaphoresis, fatigue, fever, malaise/fatigue and unexpected weight change.   HENT: Negative.  Negative for congestion, hearing loss, postnasal drip, rhinorrhea, sore throat, trouble swallowing and voice change.    Eyes: Negative.  Negative for blurred vision and visual disturbance.   Respiratory: Negative.  Negative for cough, choking, chest tightness and shortness of breath.    Cardiovascular: Negative for chest pain, palpitations, orthopnea, leg swelling and PND.   Gastrointestinal: Negative for abdominal distention, abdominal pain, blood in stool, constipation, diarrhea, nausea and vomiting.   Endocrine: Negative for cold intolerance, heat intolerance, polydipsia and polyuria.   Genitourinary: Negative.  Negative for difficulty urinating and frequency.   Musculoskeletal: Negative for arthralgias, back pain, gait problem, joint swelling, myalgias and neck pain.   Skin: Negative for color change, pallor, rash and wound.   Neurological: Negative for dizziness, tremors, weakness, light-headedness, numbness and headaches.   Hematological: Negative for adenopathy.   Psychiatric/Behavioral: Negative for behavioral problems,  "confusion, self-injury, sleep disturbance and suicidal ideas. The patient is not nervous/anxious.      Objective:   BP (!) 140/79 (BP Location: Left arm, Patient Position: Sitting, BP Method: Large (Manual))   Pulse 61   Ht 5' 3" (1.6 m)   Wt 90.8 kg (200 lb 2.8 oz)   SpO2 98%   BMI 35.46 kg/m²     Physical Exam  Vitals and nursing note reviewed.   Constitutional:       General: She is not in acute distress.     Appearance: Normal appearance. She is well-developed. She is not ill-appearing, toxic-appearing or diaphoretic.   HENT:      Head: Normocephalic and atraumatic.   Cardiovascular:      Rate and Rhythm: Normal rate and regular rhythm.      Heart sounds: Normal heart sounds. No murmur heard.    No friction rub. No gallop.   Pulmonary:      Effort: Pulmonary effort is normal. No respiratory distress.      Breath sounds: Normal breath sounds. No wheezing or rales.   Musculoskeletal:         General: Normal range of motion.   Skin:     General: Skin is warm.      Capillary Refill: Capillary refill takes less than 2 seconds.      Findings: No rash.   Neurological:      Mental Status: She is alert and oriented to person, place, and time.   Psychiatric:         Behavior: Behavior normal.         Thought Content: Thought content normal.         Judgment: Judgment normal.         Assessment:     1. Primary hypertension    2. Iron deficiency anemia, unspecified iron deficiency anemia type    3. Hair loss    4. Encounter for screening mammogram for malignant neoplasm of breast    5. Screen for colon cancer      Plan:   Primary hypertension  -     CBC Auto Differential; Future; Expected date: 07/28/2022  -     Comprehensive Metabolic Panel; Future  -     Lipid Panel; Future; Expected date: 07/28/2022  -     Hemoglobin A1C; Future; Expected date: 07/28/2022  -     EKG 12-lead; Future  -     amlodipine-benazepril 5-10 mg (LOTREL) 5-10 mg per capsule; Take 1 capsule by mouth once daily.  Dispense: 30 capsule; Refill: " 1  -     BLOOD PRESSURE CUFF Misc; Use to check blood pressure once daily  Dispense: 1 each; Refill: 0    Iron deficiency anemia, unspecified iron deficiency anemia type  -     CBC Auto Differential; Future; Expected date: 07/28/2022  -     Iron and TIBC; Future; Expected date: 07/28/2022  -     Ferritin; Future; Expected date: 07/28/2022    Hair loss  -     Ambulatory referral/consult to Dermatology; Future; Expected date: 08/04/2022  -     TSH; Future    Encounter for screening mammogram for malignant neoplasm of breast  -     Mammo Digital Screening Bilat; Future; Expected date: 07/28/2022    Screen for colon cancer  -     Case Request Endoscopy: COLONOSCOPY      Follow up in about 2 weeks (around 8/11/2022), or if symptoms worsen or fail to improve.

## 2022-07-28 NOTE — TELEPHONE ENCOUNTER
----- Message from Peace Lu PA-C sent at 7/28/2022  4:41 PM CDT -----  Labs look great. Everything in normal range. No need for iron supplementation. Iron is in normal range.

## 2022-07-29 ENCOUNTER — TELEPHONE (OUTPATIENT)
Dept: INTERNAL MEDICINE | Facility: CLINIC | Age: 57
End: 2022-07-29
Payer: COMMERCIAL

## 2022-09-11 DIAGNOSIS — Z86.010 HISTORY OF COLON POLYPS: Primary | ICD-10-CM

## 2022-12-08 NOTE — PROGRESS NOTES
Event BP = 185/91.  with pcp, carolina menezes rn -- KRYSTLE Menezes  DISCUSSED: Diet/food choice; Monitor BP, log and share results with MD; S/S of heart attack/stroke and emergency response AS RELATED TO: Cholesterol levels; Blood Pressure; BMI/Waist Circumference; Health maintenance & well-being  PCP STATUS: No PCP  REC FOLLOW-UP WITH PCP: Sent to [facility]; Appt scheduled at event; WHEN: Same day/next day FOR: R/T BP; Routine wellness visit and lab work  NOTES: Referred to [facility] for appt tomorrow.  Pt denies s/s of HTN or any distress r/t BP. -- XENA Valderrama RN  Onsite consult was completed. Screening results and educational material were sent to the participant.   A follow up call to the participant was made to check on BP since the event. No answer. 8/15/22 - A message was left with the [employer] office number for a return call. 10/10/2022 - A follow up email was sent through [employer].

## 2023-04-08 ENCOUNTER — IMMUNIZATION (OUTPATIENT)
Dept: PRIMARY CARE CLINIC | Facility: CLINIC | Age: 58
End: 2023-04-08
Payer: COMMERCIAL

## 2023-04-08 DIAGNOSIS — Z23 NEED FOR VACCINATION: Primary | ICD-10-CM

## 2023-04-08 PROCEDURE — 91312 COVID-19, MRNA, LNP-S, BIVALENT BOOSTER, PF, 30 MCG/0.3 ML DOSE: CPT | Mod: PBBFAC | Performed by: FAMILY MEDICINE

## 2023-04-08 PROCEDURE — 0124A COVID-19, MRNA, LNP-S, BIVALENT BOOSTER, PF, 30 MCG/0.3 ML DOSE: CPT | Mod: CV19,PBBFAC | Performed by: FAMILY MEDICINE

## 2023-07-03 ENCOUNTER — PATIENT OUTREACH (OUTPATIENT)
Dept: ADMINISTRATIVE | Facility: HOSPITAL | Age: 58
End: 2023-07-03
Payer: COMMERCIAL